# Patient Record
Sex: MALE | Race: WHITE | NOT HISPANIC OR LATINO
[De-identification: names, ages, dates, MRNs, and addresses within clinical notes are randomized per-mention and may not be internally consistent; named-entity substitution may affect disease eponyms.]

---
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
## 2021-05-30 ENCOUNTER — RECORDS - HEALTHEAST (OUTPATIENT)
Dept: ADMINISTRATIVE | Facility: CLINIC | Age: 21
End: 2021-05-30

## 2021-06-01 VITALS — WEIGHT: 189 LBS

## 2021-06-02 VITALS — WEIGHT: 178 LBS

## 2021-06-19 NOTE — PROGRESS NOTES
Chief Complaint   Patient presents with     Sore Throat     Cough         HPI    Patient is here for one week of productive cough, nasal discharge and congestion, and moderate sore throat. No fever, chills, chest pain, shortness of breath.     ROS: Pertinent ROS noted in HPI.     No Known Allergies    Patient Active Problem List   Diagnosis     Concussion       No family history on file.    Social History     Social History     Marital status: Single     Spouse name: N/A     Number of children: N/A     Years of education: N/A     Occupational History     Not on file.     Social History Main Topics     Smoking status: Passive Smoke Exposure - Never Smoker     Smokeless tobacco: Never Used     Alcohol use Not on file     Drug use: Not on file     Sexual activity: Not on file     Other Topics Concern     Not on file     Social History Narrative    Immunization status is up-to-date.          Objective:    Vitals:    08/11/18 1156   BP: (!) 90/58   Pulse: 97   Resp: 14   Temp: 98.3  F (36.8  C)   SpO2: 95%       Gen:NAD  Throat: oropharynx clear, tonsils normal  Ears: TMs clear without effusion, canals normal with minimal cerumen   Nose: no discharge  Neck:NAD  CV: RRR, normal S1S2, no M, R, G  Pulm: CTAB, normal effort    Recent Results (from the past 24 hour(s))   Rapid Strep A Screen-Throat swab   Result Value Ref Range    Rapid Strep A Antigen No Group A Strep detected, presumptive negative No Group A Strep detected, presumptive negative           URI with cough and congestion - benign exam, supportive cares as directed.    Throat pain  -     Rapid Strep A Screen-Throat swab  -     Group A Strep, RNA Direct Detection, Throat

## 2021-06-24 NOTE — TELEPHONE ENCOUNTER
RN triage   Call from pt mom -- mom is with pt and signed consent in chart   Hx = pt has snowboard injury on 2/23 -- injured ribs -- seen in UC and told either bruised or fx ribs -- got better   Today - bent over and felt something shift and having rib pain since then -- and pain w/ breathing  -- states he is breathing OK - but ribs hurt with breathing   No bleeding or bruising   No cough   Pt is able to lift arms   Advised pt be seen   Transferred to   Rosalva Harris RN [facility] RN triage    Reason for Disposition    MODERATE chest pain or crying, but no respiratory distress    Protocols used: CHEST INJURY-P-OH

## 2021-06-24 NOTE — PROGRESS NOTES
Office Visit - Follow Up   Quintin Banks   18 y.o. male    Date of Visit: 3/13/2019    Chief Complaint   Patient presents with     rib pain     left side, injury from 2.23        Assessment and Plan   1. Rib pain on left side  - XR Ribs Left; Future  XR RIBS LEFT  3/13/2019 3:06 PM     INDICATION: Pleurodynia  COMPARISON: 2/23/2019.     FINDINGS: The visualized heart and lungs are negative. No rib fractures.    Recommended taking easy with lifting, pushing, and pulling.  Use warm compresses to the affected rib and use ibuprofen as needed.  Patient verbalized understanding and all questions were answered.       History of Present Illness   This 18 y.o. old male presents to the clinic for a follow up. Patient reports that on 2/23/2019 he was snow boarding when he went off a jump and landed directly onto his left side.  He reported that he did not hit his head and did not lose consciousness.  But he did develop a repeat abdominal pain after that.  Patient did go to an urgent care and had a chest x-ray that was negative for rib fracture.  Later the next day patient started having nonbloody emesis which he went into the ED for.  Patient reported that since then pain has gradually improved until today while he was at work he had a pop sound on his left rib which led to increased pain.  Patient is here today to get reevaluated for possible rib fracture.  Patient denied any bloody sputum or vomiting.  Patient denies shortness of breath, fever or chills.    Review of Systems: A comprehensive review of systems was negative except as noted.     Medications, Allergies and Problem List   Reviewed and updated     Physical Exam   General Appearance: well groomed    /78   Pulse 84   Wt 178 lb (80.7 kg)   SpO2 98%        Additional Information   Current Outpatient Medications   Medication Sig Dispense Refill     acetaminophen (TYLENOL) 325 MG tablet Take 650 mg by mouth every 6 (six) hours as needed for pain.       No  current facility-administered medications for this visit.      No Known Allergies  Social History     Tobacco Use     Smoking status: Passive Smoke Exposure - Never Smoker     Smokeless tobacco: Never Used   Substance Use Topics     Alcohol use: Not on file     Drug use: Not on file       Reviewed previous chest and confirmed negative chest.         Kelly Pratt, CNP